# Patient Record
Sex: MALE | Race: WHITE
[De-identification: names, ages, dates, MRNs, and addresses within clinical notes are randomized per-mention and may not be internally consistent; named-entity substitution may affect disease eponyms.]

---

## 2018-03-25 ENCOUNTER — HOSPITAL ENCOUNTER (EMERGENCY)
Dept: HOSPITAL 31 - C.ER | Age: 43
Discharge: HOME | End: 2018-03-25
Payer: MEDICAID

## 2018-03-25 VITALS
TEMPERATURE: 98 F | HEART RATE: 78 BPM | DIASTOLIC BLOOD PRESSURE: 69 MMHG | RESPIRATION RATE: 16 BRPM | SYSTOLIC BLOOD PRESSURE: 135 MMHG

## 2018-03-25 VITALS — OXYGEN SATURATION: 98 %

## 2018-03-25 DIAGNOSIS — R11.2: Primary | ICD-10-CM

## 2018-03-25 LAB
ALBUMIN SERPL-MCNC: 4.7 G/DL (ref 3.5–5)
ALBUMIN/GLOB SERPL: 1.1 {RATIO} (ref 1–2.1)
ALT SERPL-CCNC: 27 U/L (ref 21–72)
AST SERPL-CCNC: 46 U/L (ref 17–59)
BACTERIA #/AREA URNS HPF: (no result) /[HPF]
BASOPHILS # BLD AUTO: 0 K/UL (ref 0–0.2)
BASOPHILS NFR BLD: 0.2 % (ref 0–2)
BILIRUB UR-MCNC: NEGATIVE MG/DL
BUN SERPL-MCNC: 12 MG/DL (ref 9–20)
CALCIUM SERPL-MCNC: 9.1 MG/DL (ref 8.6–10.4)
EOSINOPHIL # BLD AUTO: 0 K/UL (ref 0–0.7)
EOSINOPHIL NFR BLD: 0.1 % (ref 0–4)
ERYTHROCYTE [DISTWIDTH] IN BLOOD BY AUTOMATED COUNT: 13 % (ref 11.5–14.5)
GFR NON-AFRICAN AMERICAN: > 60
GLUCOSE UR STRIP-MCNC: NORMAL MG/DL
HGB BLD-MCNC: 15.5 G/DL (ref 12–18)
LEUKOCYTE ESTERASE UR-ACNC: (no result) LEU/UL
LYMPHOCYTE: 3 % (ref 20–40)
LYMPHOCYTES # BLD AUTO: 0.3 K/UL (ref 1–4.3)
LYMPHOCYTES NFR BLD AUTO: 3.7 % (ref 20–40)
MCH RBC QN AUTO: 30.1 PG (ref 27–31)
MCHC RBC AUTO-ENTMCNC: 33.8 G/DL (ref 33–37)
MCV RBC AUTO: 89.1 FL (ref 80–94)
MONOCYTE: 3 % (ref 0–10)
MONOCYTES # BLD: 0.4 K/UL (ref 0–0.8)
MONOCYTES NFR BLD: 4.6 % (ref 0–10)
NEUTROPHILS # BLD: 8.7 K/UL (ref 1.8–7)
NEUTROPHILS NFR BLD AUTO: 91.4 % (ref 50–75)
NEUTROPHILS NFR BLD AUTO: 93 % (ref 50–75)
NEUTS BAND NFR BLD: 1 % (ref 0–2)
NRBC BLD AUTO-RTO: 0 % (ref 0–2)
PH UR STRIP: 6 [PH] (ref 5–8)
PLATELET # BLD EST: NORMAL 10*3/UL
PLATELET # BLD: 242 K/UL (ref 130–400)
PMV BLD AUTO: 8.7 FL (ref 7.2–11.7)
PROT UR STRIP-MCNC: NEGATIVE MG/DL
RBC # BLD AUTO: 5.14 MIL/UL (ref 4.4–5.9)
RBC # UR STRIP: NEGATIVE /UL
SP GR UR STRIP: 1.02 (ref 1–1.03)
SQUAMOUS EPITHIAL: < 1 /HPF (ref 0–5)
TOTAL CELLS COUNTED BLD: 100
UROBILINOGEN UR-MCNC: 4 MG/DL (ref 0.2–1)
WBC # BLD AUTO: 9.5 K/UL (ref 4.8–10.8)

## 2018-03-25 PROCEDURE — 96374 THER/PROPH/DIAG INJ IV PUSH: CPT

## 2018-03-25 PROCEDURE — 70450 CT HEAD/BRAIN W/O DYE: CPT

## 2018-03-25 PROCEDURE — 99285 EMERGENCY DEPT VISIT HI MDM: CPT

## 2018-03-25 PROCEDURE — 85025 COMPLETE CBC W/AUTO DIFF WBC: CPT

## 2018-03-25 PROCEDURE — 81001 URINALYSIS AUTO W/SCOPE: CPT

## 2018-03-25 PROCEDURE — 80053 COMPREHEN METABOLIC PANEL: CPT

## 2018-03-25 NOTE — C.PDOC
History Of Present Illness





Patient is a 43 y/o male who presents to the ED with complaints of nausea and 

vomiting since last night. Patient awoke this morning with a persistent headache

, prompting ED visit. 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 

received as a baby and a revision in . Denies ongoing medical problems, 

abdominal pain, diarrhea, fever or chills. Patient also notes feeling nauseous 

all day today; attempted to drink liquids but subsequently vomited 

approximately 1 hour ago. No other physical complaints at this time. 


Time Seen by Provider: 18 18:29


Chief Complaint (Nursing): Headache


History Per: Patient


History/Exam Limitations: no limitations


Onset/Duration Of Symptoms: Hrs (last night)


Current Symptoms Are (Timing): Still Present


Recent travel outside of the [place]: No





Past Medical History


Reviewed: Historical Data, Nursing Documentation, Vital Signs


Vital Signs: 


 Last Vital Signs











Temp  98.5 F   18 19:47


 


Pulse  82   18 19:47


 


Resp  20   18 19:47


 


BP  107/70   18 19:47


 


Pulse Ox  99   18 19:47














- Medical History


PMH: No Chronic Diseases


Other Surgeries:  shunt placed as infant and revised in 





- [facility] Procedures








REMOVAL FB FROM FOOT (13)


TETANUS TOXOID ADMINIST (13)








Family History: States: No Known Family Hx





- Social History


Hx Tobacco Use: No


Hx Alcohol Use: No


Hx Substance Use: No





- Immunization History


Hx Tetanus Toxoid Vaccination: No





Review Of Systems


Constitutional: Negative for: Fever, Chills


Gastrointestinal: Positive for: Nausea, Vomiting.  Negative for: Abdominal Pain

, Diarrhea





Physical Exam





- Physical Exam


Appears: No Acute Distress


Skin: Normal Color, Warm, Dry


Head: Atraumatic, Normacephalic


Oral Mucosa: Dry


Neck: Supple


Cardiovascular: Rhythm Regular, No Murmur


Respiratory: Normal Breath Sounds, No Rales, No Rhonchi, No Wheezing


Gastrointestinal/Abdominal: Soft, No Tenderness


Neurological/Psych: Oriented x3, Normal Speech, Normal Cognition, Other (no 

focal deficits)





ED Course And Treatment





- Laboratory Results


Result Diagrams: 


 18 19:04





 18 19:01


Lab Interpretation: No Acute Changes


O2 Sat by Pulse Oximetry: 98


Pulse Ox Interpretation: Normal





- CT Scan/US


  ** CT Head


Other Rad Studies (CT/US): Read By Radiologist, Radiology Report Reviewed


CT/US Interpretation: Accession No. : [id number].  Patient Name / ID : 

ANDREW MENDIOLA  / [id number].  Exam Date : 2018 19:14:26 ( Approved ).  

Study Comment :  Sex / Age : M  / 42Y.  Creator : Juanita Gonzales MD.  Dictator 

:   :  Approver : Juanita Gonzales MD.  Approver2 :  Report Date :  20:08:00.  My Comment :  ***********************************************

************************************.  [facility] 

Division of Radiology.  [address] [place] [address].  Tel. no. [phone].  .  .  Patient Name: MARLENA MORALES            Account #: 

[id number].  Pt. Address: [address] [place] [address]. Rec #: 

[id number].  [place] [address]            Ordering Dr: Zeke JIMENEZ,Emelyn Simon.  Pt Phone: [phone]                             Order Location: 

[facility]  : 1975 Male Age: 42            Order #: [id number].  Accession #

: [id number].  Reason for exam: R/O Bleed.  .  .  .  .  .  CT Scan.  .  .  

HEAD W/O CONTRAST                              Exam Date: 18.  .  This 

imaging exam was performed at [facility].  EXAM:  CT Head Without 

Intravenous Contrast.  .  CLINICAL HISTORY:  42 years old, male; Pain; Headache 

and other: R/O bleed; Prior surgery.  .  TECHNIQUE:  Axial computed tomography 

images of the head/brain without intravenous.  contrast.  All CT scans at this 

facility use one or more dose reduction.  techniques, viz.: automated exposure 

control; ma/kV adjustment per patient size.  (including targeted exams where 

dose is matched to indication; i.e. head); or.  iterative reconstruction 

technique.  Coronal and sagittal reformatted images were created and reviewed.  

.  COMPARISON:  No relevant prior studies available.  .  FINDINGS:  Brain/

ventricles:  Right parietal approach shunt noted, tip appears to be in.  

expected position. Associated artifact limits evaluation. No hemorrhage.  No.  

significant white matter disease.  No edema.  No hydrocephalus.  Bones:  Skull 

is intact.  Sinuses:  No acute sinusitis.  Mastoid air cells:  No mastoid 

effusion.  .  IMPRESSION:  No CT evidence of acute intracranial abnormality.  

Right parietal approach shunt noted, tip appears to be in expected position.  

Associated artifact limits evaluation.  Recommend correlation with prior 

imaging.  .  Dictated By:  Juanita Gonzales MD.  Dictated Date/Time:  18.  Signed By:  Juanita Gonzales.  Date Signed: 18.  Transcribed By: 

[facility].  Transcribe Date/Time: 18.  FERNANDO/MT


Progress Note: Head CT, blood work, and UA ordered. [place] and IV fluids 

administered.


Reevaluation Time: 20:22


Reassessment Condition: Improved





Disposition


Counseled Patient/Family Regarding: Studies Performed, Diagnosis, Need For 

Followup, Rx Given





- Disposition


Referrals: 


[facility] [Outside]


Disposition: HOME/ ROUTINE


Disposition Time: 20:26


Condition: IMPROVED


Prescriptions: 


Ondansetron ODT [Zofran ODT] 1 odt PO QID PRN #10 odt


 PRN Reason: Nausea/Vomiting


Instructions:  Nausea and Vomiting, Adult


Forms:  [facility] (English)





- Clinical Impression


Clinical Impression: 


 Nausea and vomiting








- Scribe Statement


The provider has reviewed the documentation as recorded by the Scribe





Brittany Chavira





All medical record entries made by the Scribe were at my direction and 

personally dictated by me. I have reviewed the chart and agree that the record 

accurately reflects my personal performance of the history, physical exam, 

medical decision making, and the department course for this patient. I have 

also personally directed, reviewed, and agree with the discharge instructions 

and disposition.

## 2018-03-25 NOTE — CT
EXAM:

  CT Head Without Intravenous Contrast



CLINICAL HISTORY:

  42 years old, male; Pain; Headache and other: R/O bleed; Prior surgery



TECHNIQUE:

  Axial computed tomography images of the head/brain without intravenous 

contrast.  All CT scans at this facility use one or more dose reduction 

techniques, viz.: automated exposure control; ma/kV adjustment per patient size 

(including targeted exams where dose is matched to indication; i.e. head); or 

iterative reconstruction technique.

  Coronal and sagittal reformatted images were created and reviewed.



COMPARISON:

  No relevant prior studies available.



FINDINGS:

  Brain/ventricles:  Right parietal approach shunt noted, tip appears to be in 

expected position. Associated artifact limits evaluation. No hemorrhage.  No 

significant white matter disease.  No edema.  No hydrocephalus.

  Bones:  Skull is intact.

  Sinuses:  No acute sinusitis.

  Mastoid air cells:  No mastoid effusion.



IMPRESSION:     

  No CT evidence of acute intracranial abnormality.

  Right parietal approach shunt noted, tip appears to be in expected position. 

Associated artifact limits evaluation. 

  Recommend correlation with prior imaging.